# Patient Record
Sex: FEMALE | Race: BLACK OR AFRICAN AMERICAN | Employment: UNEMPLOYED | URBAN - METROPOLITAN AREA
[De-identification: names, ages, dates, MRNs, and addresses within clinical notes are randomized per-mention and may not be internally consistent; named-entity substitution may affect disease eponyms.]

---

## 2019-10-18 ENCOUNTER — HOSPITAL ENCOUNTER (OUTPATIENT)
Dept: CT IMAGING | Age: 39
Discharge: HOME | End: 2019-10-18
Payer: COMMERCIAL

## 2019-10-18 DIAGNOSIS — R10.9 ABDOMINAL PAIN, UNSPECIFIED ABDOMINAL LOCATION: ICD-10-CM

## 2019-10-18 PROCEDURE — 6360000004 HC RX CONTRAST MEDICATION

## 2019-10-18 PROCEDURE — 74177 CT ABD & PELVIS W/CONTRAST: CPT

## 2019-10-18 RX ADMIN — IOHEXOL 50 ML: 240 INJECTION, SOLUTION INTRATHECAL; INTRAVASCULAR; INTRAVENOUS; ORAL at 16:51

## 2019-10-18 RX ADMIN — IOPAMIDOL 75 ML: 755 INJECTION, SOLUTION INTRAVENOUS at 16:51

## 2021-01-26 ENCOUNTER — HOSPITAL ENCOUNTER (OUTPATIENT)
Dept: PHYSICAL THERAPY | Age: 41
Setting detail: THERAPIES SERIES
Discharge: HOME | End: 2021-01-26
Payer: COMMERCIAL

## 2021-05-17 NOTE — PLAN OF CARE
[facility]  Outpatient Rehabilitation and Therapy,  [facility]  [address] [facility], [facility]  Phone: [phone]   Fax:     [phone]          Physical Therapy Certification    Dear Referring Practitioner: Antonia Hunt MD,    We had the pleasure of evaluating the following patient for physical therapy services at [facility] and Therapy. A summary of our findings can be found in the initial assessment below. This includes our plan of care. If you have any questions or concerns regarding these findings, please do not hesitate to contact me at the office phone number checked above.   Thank you for the referral.       Physician Signature:_______________________________Date:__________________  By signing above (or electronic signature), therapists plan is approved by physician            Patient: Karl Ji   : 1980   MRN: [id number]     Referring Physician: Referring Practitioner: Antonia Hunt MD      Evaluation Date: 2021        Medical Diagnosis Information:  Diagnosis: Fibromyalgia, cervical disc disorder   Treatment Diagnosis: Decreased functional mobility 2/2 cervical pain/fibromyalgia                                         Insurance information: PT Insurance Information: [address]    Precautions/ Contra-indications:   Latex Allergy:  [x]NO      []YES  Preferred Language for Healthcare:   [x]English       []other:    C-SSRS Triggered by Intake questionnaire (Past 2 wk assessment ):   [x] No, Questionnaire did not trigger screening.   [] Yes, Patient intake triggered C-SSRS Screening      [] C-SSRS Screening completed  [] PCP notified via Epic     SUBJECTIVE: Patient stated complaint:    Relevant Medical History:   Functional Disability Index:     Pain Scale: /10  Easing factors:   Provocative factors:      Type: []Constant   []Intermittent  []Radiating []Localized []other: Craniocerv Flex testing Giulia Northern [] [] []    End Range Tolerance testing. [] [] []     [] [] []                           [x] Patient history, allergies, meds reviewed. Medical chart reviewed. See intake form. Review Of Systems (ROS):  [x]Performed Review of systems (Integumentary, CardioPulmonary, Neurological) by intake and observation. Intake form has been scanned into medical record. Patient has been instructed to contact their primary care physician regarding ROS issues if not already being addressed at this time. Co-morbidities/Complexities (which will affect course of rehabilitation):   []None           Arthritic conditions   []Rheumatoid arthritis (M05.9)  []Osteoarthritis (M19.91)   Cardiovascular conditions   []Hypertension (I10)  []Hyperlipidemia (E78.5)  []Angina pectoris (I20)  []Atherosclerosis (I70)  []CVA Musculoskeletal conditions   []Disc pathology   []Congenital spine pathologies   []Prior surgical intervention  []Osteoporosis (M81.8)  []Osteopenia (M85.8)   Endocrine conditions   []Hypothyroid (E03.9)  []Hyperthyroid Gastrointestinal conditions   []Constipation (R55.01)   Metabolic conditions   []Morbid obesity (E66.01)  []Diabetes type 1(E10.65) or 2 (E11.65)   []Neuropathy (G60.9)     Pulmonary conditions   []Asthma (J45)  []Coughing   []COPD (J44.9)   Psychological Disorders  []Anxiety (F41.9)  []Depression (F32.9)   []Other:   []Other:          Barriers to/and or personal factors that will affect rehab potential:              []Age  []Sex   []Smoker              []Motivation/Lack of Motivation                        []Co-Morbidities              []Cognitive Function, education/learning barriers              []Environmental, home barriers              []profession/work barriers  []past PT/medical experience  []other:  Justification:     Falls Risk Assessment (30 days):   [x] Falls Risk assessed and no intervention required.   [] Falls Risk assessed and Patient requires intervention due to being higher risk   TUG score (>12s at risk):     [] Falls education provided, including     ASSESSMENT: ***   Functional Impairments:     []Noted cervical/thoracic/GHJ joint hypomobility   []Noted cervical/thoracic/GHJ joint hypermobility   []Decreased cervical/UE functional ROM   []Noted Headache pain aggravated by neck movements with/without dizziness   []Abnormal reflexes/sensation/myotomal/dermatomal deficits   []Decreased DCF control or ability to hold head up   []Decreased RC/scapular/core strength and neuromuscular control    []Decreased UE functional strength   []other:      Functional Activity Limitations (from functional questionnaire and intake)   []Reduced ability to tolerate prolonged functional positions   []Reduced ability or difficulty with changes of positions or transfers between positions   []Reduced ability to maintain good posture and demonstrate good body mechanics with sitting, bending, and lifting   [] Reduced ability or tolerance with driving and/or computer work   []Reduced ability to perform lifting, reaching, carrying tasks   []Reduced ability to concentrate   []Reduced ability to sleep    []Reduced ability to tolerate any impact through UE or spine   []Reduced ability to ambulate prolonged functional periods/distances   []other:    Participation Restrictions   []Reduced participation in self care activities   []Reduced participation in home management activities   []Reduced participation in work activities   []Reduced participation in social activities. []Reduced participation in sport/recreational activities.     Classification/Subgrouping:   []signs/symptoms consistent with neck pain with mobility deficits     []signs/symptoms consistent with neck pain with movement coordinated impairments    []signs/symptoms consistent with neck pain with radiating pain    []signs/symptoms consistent with neck pain with headaches (cervicogenic)    []Signs/symptoms consistent with nerve root involvement including myotome & dermatome dysfunction   []sign/symptoms consistent with facet dysfunction of cervical and thoracic spine    []signs/symptoms consistent suggesting central cord compression/UMN syndromes   []signs/symptoms consistent with discogenic cervical pain   []signs/symptoms consistent with rib dysfunction   []signs/symptoms consistent with postural dysfunction   []signs/symptoms consistent with shoulder pathology    []signs/symptoms consistent with post-surgical status including decreased ROM, strength and function.    []signs/symptoms consistent with pathology which may benefit from Dry Needling   []signs/symptoms which may limit the use of advanced manual therapy techniques: (Elevated CV risk profile, recent trauma, intolerance to end range positions, prior TIA, visual issues, UE neurological compromise )     Prognosis/Rehab Potential:      []Excellent   []Good    []Fair   []Poor    Tolerance of evaluation/treatment:    []Excellent   []Good    []Fair   []Poor    Physical Therapy Evaluation Complexity Justification  [x] A history of present problem with:  [x] no personal factors and/or comorbidities that impact the plan of care;  []1-2 personal factors and/or comorbidities that impact the plan of care  []3 personal factors and/or comorbidities that impact the plan of care  [x] An examination of body systems using standardized tests and measures addressing any of the following: body structures and functions (impairments), activity limitations, and/or participation restrictions;:  [x] a total of 1-2 or more elements   [] a total of 3 or more elements   [] a total of 4 or more elements   [x] A clinical presentation with:  [x] stable and/or uncomplicated characteristics   [] evolving clinical presentation with changing characteristics  [] unstable and unpredictable characteristics;   [x] Clinical decision making of [] low, [] moderate, [] high complexity using standardized patient assessment

## 2021-05-18 ENCOUNTER — HOSPITAL ENCOUNTER (OUTPATIENT)
Dept: PHYSICAL THERAPY | Age: 41
Setting detail: THERAPIES SERIES
Discharge: HOME | End: 2021-05-18
Payer: COMMERCIAL

## 2021-05-18 PROCEDURE — 97530 THERAPEUTIC ACTIVITIES: CPT

## 2021-05-18 PROCEDURE — 97161 PT EVAL LOW COMPLEX 20 MIN: CPT

## 2021-05-18 ASSESSMENT — PAIN SCALES - QUEBEC BACK PAIN DISABILITY SCALE
WALK A FEW BLOCKS OR 300 TO 400M: 3
WALK SEVERAL KILOMETERS  OR MILES: 5
RIDE IN A CAR: 4
QUEBEC CMS MODIFIER: CK
MAKE YOUR BED: 0
PUT ON SOCKS OR PANYHOSE: 3
REACH UP TO HIGH SHELVES: 3
TURN OVER IN BED: 0
STAND UP FOR 20 TO 30 MINUTES: 4
SLEEP THROUGH THE NIGHT: 2
TAKE FOOD OUT OF THE REFRIGERATOR: 0
PULL OR PUSH HEAVY DOORS: 1

## 2021-05-18 NOTE — PLAN OF CARE
[facility] - Outpatient Rehabilitation and Therapy,  [facility]  [address] [facility], [facility]  Phone: [phone]   Fax:     [phone]          Physical Therapy Certification    Dear Referring Practitioner: Clayton Johnson MD,    We had the pleasure of evaluating the following patient for physical therapy services at [facility] and Therapy. A summary of our findings can be found in the initial assessment below. This includes our plan of care. If you have any questions or concerns regarding these findings, please do not hesitate to contact me at the office phone number checked above. Thank you for the referral.       Physician Signature:_______________________________Date:__________________  By signing above (or electronic signature), therapists plan is approved by physician            Patient: Isabel Shay   : 1980   MRN: [id number]  Referring Physician: Referring Practitioner: Clayton Johnson MD      Evaluation Date: 2021      Medical Diagnosis Information:  Diagnosis: Fibromyalgia, cervical disc disorder   Treatment Diagnosis: Decreased functional mobility 2/2 cervical pain/fibromyalgia                                         Insurance information: PT Insurance Information: [address]    Precautions/ Contra-indications:   Latex Allergy:  [x]NO      []YES  Preferred Language for Healthcare:   [x]English       []other:    C-SSRS Triggered by Intake questionnaire (Past 2 wk assessment ):   [] No, Questionnaire did not trigger screening.    [x] Yes, Patient intake triggered C-SSRS Screening      [x] C-SSRS Screening completed - pt is having trouble getting into a therapist b/c not many take [place] and is currently on a waiting list and have a virtual visit set up in July and calls daily to try to get in early  [x] PCP notified via Epic     C-SSRS Triggered by Intake questionnaire:     YES NO In the past month, have you wished you were dead or wished you could go to sleep and not wake up? x    In the past month, have you had any thoughts of killing yourself? X                    Lifetime   YES NO   Have you ever done anything, started to do anything, or prepared to do anything to end your life? X             Past 3 months    X         SUBJECTIVE: Patient stated complaint: Pt notes new dx of fibromyalgia but has been dealing with pain since 2014. Pain has worsened over the past few months with pain worse in back, neck, upper shoulders and thighs. Pain up to 8/10 on a typical day and gets N/T with activity. Pt has TENS that she uses when needed. Relevant Medical History: boot on left ankle from trip down the steps 2 wks ago ( to f/u with MD in 3 wks - but MD is aware that she will be getting into the pool for fibromyalgia and is ok with this and ok to wean out of boot when ready ); cervical fusion 2020; Crohns disease; HNP lumbar spine; anxiety; depression; migraines    Functional Disability Index: [place] 49    Pain Scale: 8/10  Easing factors: heat  Provocative factors: activity     Type: [x]Constant   []Intermittent  []Radiating []Localized []other:     Numbness/Tingling: yes \"with activity\"    Occupation/School: NA      Living Status/Prior Level of Function: Independent with ADLs and IADLs,     OBJECTIVE:   Palpation: TTP neck, UT, mid and low back     Functional Mobility/Transfers:     Posture: WFL    Bandages/Dressings/Incisions: NA    Gait: (include devices/WB status):  WNL    CERV ROM     Cervical Flexion 40    Cervical Extension 45     Left Right   Cervical SB 25 30   Lumbar flex   50  Lumbar ext  25  Lumbar SB L  15  Lumbar SB R  18     Cervical rotation Min dec Min dec    Quadrant     Dorsal Glide      UE ROM Left Right   Shoulder Flex All motions WFL but limited at end range with pain B     Shoulder Abd     Shoulder ER     Shoulder IR     Elbow flex/ext     Wrist flex/ext/pro/sup Finger flex/ext/opposition     Shoulder AROM WNL w OP     UE Strength  Left Right   Shoulder Flex 4+ 4+   Shoulder Scap 4+ 4+   Shoulder ABd (C5 Axillary)     Shoulder ER  5 5   Shoulder IR 5 5   Elbow Flex (C5 Musc) 5 5   Elbow Ext (C7 Radial) 5 5   Hip flex   QD  HS  DF 4  4+  4+  NT - boot 5  5  5  5   Wrist Flex (C6 Radial)     Wrist Ext (C7 Radial)     Finger flex (C8 median)     Finger ext (C7 Radial-PIN)     APB (T1 Median)     Finger Abd (T1 Ulnar)     UE myotomes WNL        Reflexes Normal Abnormal Comments               S1-2 Seated achilles [] []    S1-2 Prone knee bend [] []    L3-4 Patellar tendon [] []    C5-6 Biceps [] []    C6 Brachioradialis [] []    C7-8 Triceps [] []      LE flexibility - min tight SKC, pirif, and HS B with pain at end range all     Reflexes/Sensation:    [x]Dermatomes/Myotomes intact    [x]Reflexes equal and normal bilaterally   []Other:    Joint mobility: NT   []Normal    []Hypo   []Hyper      Orthopedic Special Tests: NT    Cluster Testing  Normal Abnormal N/A Comments   Babinski Test [] [] []    Huber Test [] [] []    Inverted Sup Sign [] [] []    Alar Ligament Test [] [] []    Transverse Ligament Test [] [] []    Sharp-Rodney Test [] [] []    Hautards Test [] [] []    Vertebral Artery Test [] [] []             Neural dynamic/ Tension testing Normal Abnormal N/A Comments   Spurling Maneuver:  [] [] []    Distraction testing: [] [] []    ULNT [] [] []    Shoulder Abd testing  [] [] []    Cerv Rot/Lat Flex- 1st Rib [] [] []    Deep Neck Flex/endurance testing [] [] []    Craniocerv Flex testing Romayne Part [] [] []    End Range Tolerance testing. [] [] []     [] [] []                           [x] Patient history, allergies, meds reviewed. Medical chart reviewed. See intake form. Review Of Systems (ROS):  [x]Performed Review of systems (Integumentary, CardioPulmonary, Neurological) by intake and observation. Intake form has been scanned into medical record.  Patient has been instructed to contact their primary care physician regarding ROS issues if not already being addressed at this time. Co-morbidities/Complexities (which will affect course of rehabilitation):   []None           Arthritic conditions   []Rheumatoid arthritis (M05.9)  []Osteoarthritis (M19.91)   Cardiovascular conditions   []Hypertension (I10)  []Hyperlipidemia (E78.5)  []Angina pectoris (I20)  []Atherosclerosis (I70)  []CVA Musculoskeletal conditions   []Disc pathology   []Congenital spine pathologies   [x]Prior surgical intervention  []Osteoporosis (M81.8)  []Osteopenia (M85.8)   Endocrine conditions   []Hypothyroid (E03.9)  []Hyperthyroid Gastrointestinal conditions   []Constipation (S68.99)   Metabolic conditions   []Morbid obesity (E66.01)  []Diabetes type 1(E10.65) or 2 (E11.65)   []Neuropathy (G60.9)     Pulmonary conditions   []Asthma (J45)  []Coughing   []COPD (J44.9)   Psychological Disorders  [x]Anxiety (F41.9)  [x]Depression (F32.9)   []Other:   [x]Other:     Crohs  migraines     Barriers to/and or personal factors that will affect rehab potential:              []Age  []Sex   []Smoker              []Motivation/Lack of Motivation                        [x]Co-Morbidities              []Cognitive Function, education/learning barriers              []Environmental, home barriers              []profession/work barriers  []past PT/medical experience  []other:  Justification:     Falls Risk Assessment (30 days):   [x] Falls Risk assessed and no intervention required.   [] Falls Risk assessed and Patient requires intervention due to being higher risk   TUG score (>12s at risk):     [] Falls education provided, including     ASSESSMENT:    Functional Impairments:     []Noted cervical/thoracic/GHJ joint hypomobility   []Noted cervical/thoracic/GHJ joint hypermobility   []Decreased cervical/UE functional ROM   []Noted Headache pain aggravated by neck movements with/without dizziness   []Abnormal pain   []signs/symptoms consistent with rib dysfunction   []signs/symptoms consistent with postural dysfunction   []signs/symptoms consistent with shoulder pathology    []signs/symptoms consistent with post-surgical status including decreased ROM, strength and function. []signs/symptoms consistent with pathology which may benefit from Dry Needling   []signs/symptoms which may limit the use of advanced manual therapy techniques: (Elevated CV risk profile, recent trauma, intolerance to end range positions, prior TIA, visual issues, UE neurological compromise )     Prognosis/Rehab Potential:      []Excellent   [x]Good    []Fair   []Poor    Tolerance of evaluation/treatment:    []Excellent   [x]Good    []Fair   []Poor    Physical Therapy Evaluation Complexity Justification  [x] A history of present problem with:  [] no personal factors and/or comorbidities that impact the plan of care;  []1-2 personal factors and/or comorbidities that impact the plan of care  [x]3 personal factors and/or comorbidities that impact the plan of care  [x] An examination of body systems using standardized tests and measures addressing any of the following: body structures and functions (impairments), activity limitations, and/or participation restrictions;:  [] a total of 1-2 or more elements   [] a total of 3 or more elements   [x] a total of 4 or more elements   [x] A clinical presentation with:  [x] stable and/or uncomplicated characteristics   [] evolving clinical presentation with changing characteristics  [] unstable and unpredictable characteristics;   [x] Clinical decision making of [x] low, [] moderate, [] high complexity using standardized patient assessment instrument and/or measurable assessment of functional outcome.     [x] EVAL (LOW) 07284 (typically 20 minutes face-to-face)  [] EVAL (MOD) 28673 (typically 30 minutes face-to-face)  [] EVAL (HIGH) 24361 (typically 45 minutes face-to-face)  [] RE-EVAL     PLAN: Frequency/Duration:  2 days per week for 2 Weeks:  Interventions:  [x]  Therapeutic exercise including: strength training, ROM, for cervical spine,scapula, core and Upper extremity, including postural re-education. [x]  NMR activation and proprioception for Deep cervical flexors, periscapular and RC muscles and Core, including postural re-education. [x]  Manual therapy as indicated for C/T spine, ribs, Soft tissue to include: Dry Needling/IASTM, STM, PROM, Gr I-IV mobilizations, manipulation. [x] Modalities as needed that may include: thermal agents, E-stim, Biofeedback, US, iontophoresis as indicated  [x] Patient education on joint protection, postural re-education, activity modification, progression of HEP. [x] Aquatic exercise including: strength training, ROM, for cervical spine,scapula, core and Upper extremity, including postural re-education. HEP instruction: see flowsheet     GOALS:  Patient stated goal: \"to be in less pain\"  [x] Progressing: [] Met: [] Not Met: [] Adjusted    Therapist goals for Patient:   Short Term Goals: To be achieved in: 2 weeks  1. Independent in HEP and progression per patient tolerance, in order to prevent re-injury. [x] Progressing: [] Met: [] Not Met: [] Adjusted  2. Patient will have a decrease in pain by 20-30% to facilitate improvement in movement, function, and ADLs as indicated by Functional Deficits. [x] Progressing: [] Met: [] Not Met: [] Adjusted   3. Patient will tolerate 40 min pool exercise  [] Progressing: [] Met: [] Not Met: [] Adjusted    Long Term Goals: To be achieved in: at discharge   1. Disability index score of 30 or less on the [place] to assist with reaching prior level of function. [x] Progressing: [] Met: [] Not Met: [] Adjusted   2. Patient will have a decrease in pain by 20-30% to facilitate improvement in movement, function, and ADLs as indicated by Functional Deficits. [x] Progressing: [] Met: [] Not Met: [] Adjusted   3.  Patient

## 2021-05-18 NOTE — FLOWSHEET NOTE
[address] [place]  [address] [facility]  Phone: [phone]   Fax:     [phone]      Physical Therapy Daily/Aquatic Flow Sheet   Date:  2021    Patient Name:  Hima Dela Cruz    :  1980  MRN: [id number]    Restrictions/Precautions:    Pertinent Medical History:  Relevant Medical History: boot on left ankle from trip down the steps 2 wks ago ( to f/u with MD in 3 wks - but MD is aware that she will be getting into the pool for fibromyalgia and is ok with this and ok to wean out of boot when ready ); cervical fusion ; Crohns disease; HNP lumbar spine; anxiety; depression; migraines    Medical/Treatment Diagnosis Information:   · Diagnosis: Fibromyalgia, cervical disc disorder  · Treatment Diagnosis: Decreased functional mobility 2/2 cervical pain/fibromyalgia    Insurance/Certification information:  PT Insurance Information: [facility]  Physician Information:  Referring Practitioner: Dannie Kessler MD  Plan of care signed (Y/N):     Date of Patient follow up with Physician:      Progress Report: []  Yes  [x]  No     Date Range for reporting period:  Beginnin2021  Ending:      Progress report due (10 Rx/or 30 days whichever is less):     Recertification due (POC duration/ or 90 days whichever is less):      Visit # POC/Insurance Allowable Auth Needed     []Yes   [x]No     Latex Allergy:  [x]NO      []YES  Preferred Language for Healthcare:   [x]English       []Other:    Functional Scale:     Date assessed: at eval  Test: Julio Cesar   Score:49    Pain level: 8/10    History of Injury:SUBJECTIVE: Patient stated complaint: Pt notes new dx of fibromyalgia but has been dealing with pain since . Pain has worsened over the past few months with pain worse in back, neck, upper shoulders and thighs. Pain up to 8/10 on a typical day and gets N/T with activity. Pt has TENS that she uses when needed. flex & ext  Step down    Hip Abd & Adduction  [place] squats    Bicycle   Crate Lifts    Add Set with ball  Lunges forward    LX stab with med ball throws  Lunges lateral    Ankle INV  Lunges retro    Ankle EV  Lower ab curl with noodle      Upper ab curl with ball      Med ball straight lifts      Med ball diagonal lifts      Hydrorider          Noodle:      Leg Press  Deep Water:    Noodle hang at wall  Jog    Noodle hang deep water  Jumping Jacks    Noodle Bicycle  Heel to toe      Hand to opposite knee      Cross country skier      Rocking Horse      Other Therapeutic Activities:  Pt was educated on PT POC, Diagnosis, Prognosis, pathomechanics as well as frequency and duration of scheduling future physical therapy appointments. Time was also taken on this day to answer all patient questions and participation in PT. Reviewed appointment policy in detail with patient and patient verbalized understanding. Pool tour and info issued x 10 min      Home Exercise Program:  Patient was instructed in the following for HEP:     . Patient verbalized/demonstrated understanding and was issued written handout. Charges: Therapeutic Exercise and NMR EXR  [] (44480) Provided verbal/tactile cueing for activities related to strengthening, flexibility, endurance, ROM for improvements in LE, proximal hip, and core control with self care, mobility, lifting, ambulation.  [] (50947) Provided verbal/tactile cueing for activities related to improving balance, coordination, kinesthetic sense, posture, motor skill, proprioception  to assist with LE, proximal hip, and core control in self care, mobility, lifting, ambulation and eccentric single leg control.      NMR and Therapeutic Activities:    [] (25159 or 80829) Provided verbal/tactile cueing for activities related to improving balance, coordination, kinesthetic sense, posture, motor skill, proprioception and motor activation to allow for proper function of core, proximal hip and LE with self care and ADLs and functional mobility.   [] (26925) Gait Re-education- Provided training and instruction to the patient for proper LE, core and proximal hip recruitment and positioning and eccentric body weight control with ambulation re-education including up and down stairs     Home Exercise Program:    [x] (62125) Reviewed/Progressed HEP activities related to strengthening, flexibility, endurance, ROM of core, proximal hip and LE for functional self-care, mobility, lifting and ambulation/stair navigation   [] (29742)Reviewed/Progressed HEP activities related to improving balance, coordination, kinesthetic sense, posture, motor skill, proprioception of core, proximal hip and LE for self care, mobility, lifting, and ambulation/stair navigation      Manual Treatments:  PROM / STM / Oscillations-Mobs:  G-I, II, III, IV (PA's, Inf., Post.)  [] (26136) Provided manual therapy to mobilize LE, proximal hip and/or LS spine soft tissue/joints for the purpose of modulating pain, promoting relaxation,  increasing ROM, reducing/eliminating soft tissue swelling/inflammation/restriction, improving soft tissue extensibility and allowing for proper ROM for normal function with self care, mobility, lifting and ambulation.      Individual Aquatic Therapy:  [] (06625) Provided verbal and tactile cueing for strengthening, flexibility, ROM using the therapeutic properties of water (buoyancy, resistance) for improvements in core control, mobility and ambulation     Aquatic Group:  [] (23791) Provided intermittent verbal and tactile cueing for strengthening, endurance, flexibility and ROM for 2-4 individuals that do not require one-on one patient contact by the therapist       Land Timed Code Treatment Minutes: 10   Land Total Treatment Minutes: 30     Individual Aquatic Minutes:    Aquatic Group Minutes:      [x] EVAL (LOW) 57256 (typically 20 minutes face-to-face)  [] EVAL (MOD) 85782 (typically 30 minutes face-to-face)  [] EVAL (HIGH) 24140 (typically 45 minutes face-to-face)  [] RE-EVAL     [] AS(18352) x     [] Dry needle 1 or 2 Muscles (31913)  [] NMR (31119) x     [] Dry needle 3+ Muscles (21403)  [] Manual (75872) x     [] Ultrasound (15977) x  [x] TA (43334) x     [] Mech Traction (72987)  [] ES(attended) (17001)     [] ES (un) (65328):   [] Vasopump (67937) [] Ionto (33424)   [] Aquatic Ind (46928) x    [] Aquatic Group ([phone]) x   [] Other:        Treatment/Activity Tolerance:  [x] Patient tolerated treatment well [] Patient limited by fatigue  [] Patient limited by pain  [] Patient limited by other medical complications  [] Other:     Prognosis: [x] Good [] Fair  [] Poor       GOALS:  Patient stated goal: \"to be in less pain\"  [x]? Progressing: []? Met: []? Not Met: []? Adjusted     Therapist goals for Patient:   Short Term Goals: To be achieved in: 2 weeks  1. Independent in HEP and progression per patient tolerance, in order to prevent re-injury. [x]? Progressing: []? Met: []? Not Met: []? Adjusted  2. Patient will have a decrease in pain by 20-30% to facilitate improvement in movement, function, and ADLs as indicated by Functional Deficits. [x]? Progressing: []? Met: []? Not Met: []? Adjusted   3. Patient will tolerate 40 min pool exercise  []? Progressing: []? Met: []? Not Met: []? Adjusted     Long Term Goals: To be achieved in: at discharge   1. Disability index score of 30 or less on the [place] to assist with reaching prior level of function. [x]? Progressing: []? Met: []? Not Met: []? Adjusted   2. Patient will have a decrease in pain by 20-30% to facilitate improvement in movement, function, and ADLs as indicated by Functional Deficits. [x]? Progressing: []? Met: []? Not Met: []? Adjusted   3. Patient will demonstrate increased AROM to [facility] with min-no pain to allow for proper joint functioning as indicated by patients Functional Deficits. [x]? Progressing: []? Met: []?  Not Met: []?

## 2021-05-25 ENCOUNTER — HOSPITAL ENCOUNTER (OUTPATIENT)
Dept: PHYSICAL THERAPY | Age: 41
Setting detail: THERAPIES SERIES
Discharge: HOME | End: 2021-05-25
Payer: COMMERCIAL

## 2021-05-25 PROCEDURE — 97113 AQUATIC THERAPY/EXERCISES: CPT

## 2021-05-25 NOTE — FLOWSHEET NOTE
[address], [facility]  [address] [facility]  Phone: [phone]   Fax:     [phone]      Physical Therapy Daily/Aquatic Flow Sheet   Date:  2021    Patient Name:  Larna Shone    :  1980  MRN: [id number]    Restrictions/Precautions:    Pertinent Medical History:  Relevant Medical History: boot on left ankle from trip down the steps 2 wks ago ( to f/u with MD in 3 wks - but MD is aware that she will be getting into the pool for fibromyalgia and is ok with this and ok to wean out of boot when ready ); cervical fusion ; Crohns disease; HNP lumbar spine; anxiety; depression; migraines    Medical/Treatment Diagnosis Information:   · Diagnosis: Fibromyalgia, cervical disc disorder  · Treatment Diagnosis: Decreased functional mobility 2/2 cervical pain/fibromyalgia    Insurance/Certification information:  PT Insurance Information: [address]  Physician Information:  Referring Practitioner: Lottie Merino MD  Plan of care signed (Y/N):     Date of Patient follow up with Physician:      Progress Report: []  Yes  [x]  No     Date Range for reporting period:  Beginnin2021  Ending:      Progress report due (10 Rx/or 30 days whichever is less):     Recertification due (POC duration/ or 90 days whichever is less):      Visit # POC/Insurance Allowable Auth Needed     []Yes   [x]No     Latex Allergy:  [x]NO      []YES  Preferred Language for Healthcare:   [x]English       []Other:    Functional Scale:     Date assessed: at eval  Test: Julio Cesar   Score:49    Pain level:    LBP   8/10                          After Rx  9/10                  Neck  6/10                                         8/10                L ankle 8/10                                       5/10    History of Injury:SUBJECTIVE: Patient stated complaint: Pt notes new dx of fibromyalgia but has been dealing with pain since .   Pain has worsened over the past few months with pain worse in back, neck, upper shoulders and thighs. Pain up to 8/10 on a typical day and gets N/T with activity. Pt has TENS that she uses when needed.      Subjective:    5-25-21  Chief c/o L ankle, Neck/ LBP. To pool with boot L foot. Increased neck pain after sneeze yesterday where she felt a pop.  Doing L ankle theraband ex at home per MD.          Objective:    Observation:    Test measurements:    Land-based Visits Exercises/Activities: No Land Rx today  Therapeutic Ex (02099)  Min: Resistance/Repetitions Notes                  Therapeutic Activity (63762) Min:10 See below                   NMR re-education (94453) Min:                          Manual Intervention (78434)  Min:                    Modalities  Min:               Aquatic Visits Exercises/Activities:  TO and FROM POOL WITH BOOT L FOOT  Aquatic Abbreviation Key  B= Belt DB= Dumbells T= Theratube   G=Gloves N= Noodles W= Weights   P= Paddles WW=Water Walker K= Kickboard        Transfers:   LIFT due to L ankle      % Immersion: 75%-90%            Ambulation:   Exercises UE:  B   25% WB    Forwards  Shoulder Shrugs 10    Lateral  Shoulder circles 10    Marching    Scapular Retraction 10    Retro   Rolling 10    [place]  Push Downs 10   Multifidus walkouts w/paddle  IR/ER 10     Punching    Stretching:  Rowing 10   Gastroc/Soleus   Elbow Flex/Ext 10   Hamstring   Shldr Flex/Ext    Knee flex stretch   Shldr Abd/Add    Piriformis   Horiz Abd/Add    Hip flexor    Arm Circles    SKTC   PNF Diagonals    DKTC  UE oscillations f/b, s/s    ITB   Wall Push-ups    Quad  Figure 8's    Mid back   Buoy pull/push downs    UT  Tband rows    Rhom  Tband lats    Post Shoulder  Lumbar Rot w/ paddles    Pec   Small ball pull downs                   diagonals             Cervical:       AROM Flex       AROM Extension     LEs exercises:  10 % WB  AROM Side Bending    Marching  10 AROM Rotation    Heel Raises X Chin tucks    Toe Raises X Chin nods     Squats  X      Hamstring Curls  10      Hip Flexion  5   agg Balance: Hip Abduction 10 SLS    Hip Circles 10 Tandem stance    TA set 10 NBOS eyes open    Glut Set 10 NBOS eyes closed    Hip Extension  Hand to Opposite Knee    Hip Adduction    Box Step     Hip IR   Noodle Stance     Hip ER  Stop/Go Gait    Fig 8's  Switch Gait                Seated: B Functional:    Ankle Pumps  Step up forward    Ankle circles  Step up lateral    Knee flex & ext 10 Step down    Hip Abd & Adduction 10 [place] squats    Bicycle  10 Crate Lifts    Add Set with ball  Lunges forward    LX stab with med ball throws  Lunges lateral    Ankle INV  Lunges retro    Ankle EV  Lower ab curl with noodle      Upper ab curl with ball      Med ball straight lifts      Med ball diagonal lifts      Hydrorider          Noodle:      Leg Press  Deep Water:    Noodle hang at wall  Jog    Noodle hang deep water  Jumping Jacks    Noodle Bicycle  Heel to toe      Hand to opposite knee      Cross country skier      Rocking Horse      Other Therapeutic Activities:  Pt was educated on PT POC, Diagnosis, Prognosis, pathomechanics as well as frequency and duration of scheduling future physical therapy appointments. Time was also taken on this day to answer all patient questions and participation in PT. Reviewed appointment policy in detail with patient and patient verbalized understanding. Pool tour and info issued x 10 min      Home Exercise Program:  Patient was instructed in the following for HEP:     . Patient verbalized/demonstrated understanding and was issued written handout. Charges:   Therapeutic Exercise and NMR EXR  [] (38224) Provided verbal/tactile cueing for activities related to strengthening, flexibility, endurance, ROM for improvements in LE, proximal hip, and core control with self care, mobility, lifting, ambulation.  [] (66814) Provided verbal/tactile cueing for activities related to improving balance, ambulation     Aquatic Group:  [] (99159) Provided intermittent verbal and tactile cueing for strengthening, endurance, flexibility and ROM for 2-4 individuals that do not require one-on one patient contact by the therapist       Land Timed Code Treatment Minutes:    Land Total Treatment Minutes: Individual Aquatic Minutes: 40   Aquatic Group Minutes: 0     [] EVAL (LOW) 81414 (typically 20 minutes face-to-face)  [] EVAL (MOD) 32481 (typically 30 minutes face-to-face)  [] EVAL (HIGH) 36320 (typically 45 minutes face-to-face)  [] RE-EVAL     [] ZK(82447) x     [] Dry needle 1 or 2 Muscles (77650)  [] NMR (66958) x     [] Dry needle 3+ Muscles (70318)  [] Manual (95699) x     [] Ultrasound (11337) x  [] TA (14558) x     [] Mech Traction (60986)  [] ES(attended) (32599)     [] ES (un) (42964):   [] Vasopump (46413) [] Ionto (85762)   [x] Aquatic Ind (25732) x 3    [] Aquatic Group (32353) x   [] Other:        Treatment/Activity Tolerance:  [x] Patient tolerated treatment well [] Patient limited by fatigue  [] Patient limited by pain  [] Patient limited by other medical complications  [] Other:     Prognosis: [x] Good [] Fair  [] Poor       GOALS:  Patient stated goal: \"to be in less pain\"  [x]? Progressing: []? Met: []? Not Met: []? Adjusted     Therapist goals for Patient:   Short Term Goals: To be achieved in: 2 weeks  1. Independent in HEP and progression per patient tolerance, in order to prevent re-injury. [x]? Progressing: []? Met: []? Not Met: []? Adjusted  2. Patient will have a decrease in pain by 20-30% to facilitate improvement in movement, function, and ADLs as indicated by Functional Deficits. [x]? Progressing: []? Met: []? Not Met: []? Adjusted   3. Patient will tolerate 40 min pool exercise  []? Progressing: []? Met: []? Not Met: []? Adjusted     Long Term Goals: To be achieved in: at discharge   1.  Disability index score of 30 or less on the [place] to assist with reaching prior level of

## 2021-05-27 ENCOUNTER — HOSPITAL ENCOUNTER (OUTPATIENT)
Dept: PHYSICAL THERAPY | Age: 41
Setting detail: THERAPIES SERIES
Discharge: HOME | End: 2021-05-27
Payer: COMMERCIAL

## 2021-05-27 PROCEDURE — 97113 AQUATIC THERAPY/EXERCISES: CPT

## 2021-05-27 PROCEDURE — 97150 GROUP THERAPEUTIC PROCEDURES: CPT

## 2021-05-27 NOTE — FLOWSHEET NOTE
[address], [place]  [address] [facility]  Phone: [phone]   Fax:     [phone]      Physical Therapy Daily/Aquatic Flow Sheet   Date:  2021    Patient Name:  Davin Vaca    :  1980  MRN: [id number]    Restrictions/Precautions:    Pertinent Medical History:  Relevant Medical History: boot on left ankle from trip down the steps 2 wks ago ( to f/u with MD in 3 wks - but MD is aware that she will be getting into the pool for fibromyalgia and is ok with this and ok to wean out of boot when ready ); cervical fusion ; Crohns disease; HNP lumbar spine; anxiety; depression; migraines    Medical/Treatment Diagnosis Information:   · Diagnosis: Fibromyalgia, cervical disc disorder  · Treatment Diagnosis: Decreased functional mobility 2/2 cervical pain/fibromyalgia    Insurance/Certification information:  PT Insurance Information: [address]  Physician Information:  Referring Practitioner: Shree Vigil MD  Plan of care signed (Y/N):     Date of Patient follow up with Physician:      Progress Report: []  Yes  [x]  No     Date Range for reporting period:  Beginnin2021  Ending:      Progress report due (10 Rx/or 30 days whichever is less): 35    Recertification due (POC duration/ or 90 days whichever is less):      Visit # POC/Insurance Allowable Auth Needed   3/8  []Yes   [x]No     Latex Allergy:  [x]NO      []YES  Preferred Language for Healthcare:   [x]English       []Other:    Functional Scale:     Date assessed: at eval  Test: Julio Cesar   Score:49    Pain level:    LBP   9/10                          After Rx  9/10                  Neck  9/10                                         9/10                L ankle 6/10                                       7/10    History of Injury:SUBJECTIVE: Patient stated complaint: Pt notes new dx of fibromyalgia but has been dealing with pain since .   Pain has LEs exercises:  10 % WB  AROM Side Bending    Marching  10 AROM Rotation    Heel Raises X Chin tucks    Toe Raises X Chin nods     Squats  5      Hamstring Curls  5      Hip Flexion  5    Balance: Hip Abduction 5 SLS    Hip Circles 5 Tandem stance    TA set 5 NBOS eyes open    Glut Set 5 NBOS eyes closed    Hip Extension  Hand to Opposite Knee    Hip Adduction    Box Step     Hip IR   Noodle Stance     Hip ER  Stop/Go Gait    Fig 8's  Switch Gait                Seated: B Functional:    Ankle Pumps 10 Step up forward    Ankle circles  Step up lateral    Knee flex & ext 10 Step down    Hip Abd & Adduction 10 [place] squats    Bicycle  10 Crate Lifts    Add Set with ball 10  Lunges forward    LX stab with med ball throws  Lunges lateral    Ankle INV  Lunges retro    Ankle EV  Lower ab curl with noodle      Upper ab curl with ball      Med ball straight lifts      Med ball diagonal lifts      Hydrorider          Noodle:      Leg Press  Deep Water:    Noodle hang at wall  Jog    Noodle hang deep water  Jumping Jacks    Noodle Bicycle  Heel to toe      Hand to opposite knee    Jet massage   LB 2 min    [place] good Cross country skier      [employer]      Other Therapeutic Activities:  Pt was educated on PT POC, Diagnosis, Prognosis, pathomechanics as well as frequency and duration of scheduling future physical therapy appointments. Time was also taken on this day to answer all patient questions and participation in PT. Reviewed appointment policy in detail with patient and patient verbalized understanding. Pool tour and info issued x 10 min      Home Exercise Program:  Patient was instructed in the following for HEP:     . Patient verbalized/demonstrated understanding and was issued written handout. Charges:   Therapeutic Exercise and NMR EXR  [] (79045) Provided verbal/tactile cueing for activities related to strengthening, flexibility, endurance, ROM for improvements in LE, proximal hip, and core control with self care, mobility, lifting, ambulation.  [] (18086) Provided verbal/tactile cueing for activities related to improving balance, coordination, kinesthetic sense, posture, motor skill, proprioception  to assist with LE, proximal hip, and core control in self care, mobility, lifting, ambulation and eccentric single leg control. NMR and Therapeutic Activities:    [] (66774 or 48996) Provided verbal/tactile cueing for activities related to improving balance, coordination, kinesthetic sense, posture, motor skill, proprioception and motor activation to allow for proper function of core, proximal hip and LE with self care and ADLs and functional mobility.   [] (93028) Gait Re-education- Provided training and instruction to the patient for proper LE, core and proximal hip recruitment and positioning and eccentric body weight control with ambulation re-education including up and down stairs     Home Exercise Program:    [x] (00292) Reviewed/Progressed HEP activities related to strengthening, flexibility, endurance, ROM of core, proximal hip and LE for functional self-care, mobility, lifting and ambulation/stair navigation   [] (14865)Reviewed/Progressed HEP activities related to improving balance, coordination, kinesthetic sense, posture, motor skill, proprioception of core, proximal hip and LE for self care, mobility, lifting, and ambulation/stair navigation      Manual Treatments:  PROM / STM / Oscillations-Mobs:  G-I, II, III, IV (PA's, Inf., Post.)  [] (85148) Provided manual therapy to mobilize LE, proximal hip and/or LS spine soft tissue/joints for the purpose of modulating pain, promoting relaxation,  increasing ROM, reducing/eliminating soft tissue swelling/inflammation/restriction, improving soft tissue extensibility and allowing for proper ROM for normal function with self care, mobility, lifting and ambulation.      Individual Aquatic Therapy:  [x] (91922) Provided verbal and tactile cueing for strengthening, flexibility, ROM using the therapeutic properties of water (buoyancy, resistance) for improvements in core control, mobility and ambulation     Aquatic Group:  [x] (71266) Provided intermittent verbal and tactile cueing for strengthening, endurance, flexibility and ROM for 2-4 individuals that do not require one-on one patient contact by the therapist       Land Timed Code Treatment Minutes:    Land Total Treatment Minutes: Individual Aquatic Minutes: 15   Aquatic Group Minutes: 25     [] EVAL (LOW) 24582 (typically 20 minutes face-to-face)  [] EVAL (MOD) 37372 (typically 30 minutes face-to-face)  [] EVAL (HIGH) 94066 (typically 45 minutes face-to-face)  [] RE-EVAL     [] LD(10139) x     [] Dry needle 1 or 2 Muscles (85464)  [] NMR (27771) x     [] Dry needle 3+ Muscles (27121)  [] Manual (68215) x     [] Ultrasound (81653) x  [] TA (60792) x     [] Mech Traction (90479)  [] ES(attended) (12097)     [] ES (un) (07999):   [] Vasopump (75746) [] Ionto (11508)   [x] Aquatic Ind (89952) x     [x] Aquatic Group (05340) x   [] Other:        Treatment/Activity Tolerance:  [x] Patient tolerated treatment well [] Patient limited by fatigue  [] Patient limited by pain  [] Patient limited by other medical complications  [x] Other:  Cues for posture and relaxation. Prognosis: [x] Good [] Fair  [] Poor       GOALS:  Patient stated goal: \"to be in less pain\"  [x]? Progressing: []? Met: []? Not Met: []? Adjusted     Therapist goals for Patient:   Short Term Goals: To be achieved in: 2 weeks  1. Independent in HEP and progression per patient tolerance, in order to prevent re-injury. [x]? Progressing: []? Met: []? Not Met: []? Adjusted  2. Patient will have a decrease in pain by 20-30% to facilitate improvement in movement, function, and ADLs as indicated by Functional Deficits. [x]? Progressing: []? Met: []? Not Met: []? Adjusted   3. Patient will tolerate 40 min pool exercise  []? Progressing: []?  Met: []? Not Met: []? Adjusted     Long Term Goals: To be achieved in: at discharge   1. Disability index score of 30 or less on the [place] to assist with reaching prior level of function. [x]? Progressing: []? Met: []? Not Met: []? Adjusted   2. Patient will have a decrease in pain by 20-30% to facilitate improvement in movement, function, and ADLs as indicated by Functional Deficits. [x]? Progressing: []? Met: []? Not Met: []? Adjusted   3. Patient will demonstrate increased AROM to [facility] with min-no pain to allow for proper joint functioning as indicated by patients Functional Deficits. [x]? Progressing: []? Met: []? Not Met: []? Adjusted  4. Patient will demonstrate an increase in strength to 5/5 B UE/LE to allow for proper functional mobility as indicated by patients Functional Deficits. [x]? Progressing: []? Met: []? Not Met: []? Adjusted  5. Patient will return to light household duties without increased symptoms or restriction. [x]? Progressing: []? Met: []? Not Met: []? Adjusted  6. \"sleep soundly\"  [x]? Progressing: []? Met: []? Not Met: []? Adjusted            Patient Requires Follow-up: [x] Yes  [] No    Plan:   [x] Continue per plan of care [] Alter current plan (see comments)  [] Plan of care initiated [] Hold pending MD visit [] Discharge    Plan for Next Session:  Gradually progress aquatic exercise as tolerated to increase   ROM, strength, and endurance to improve ADL's and decrease pain. ADD: increase gt, seated ex, NBOS balance,  as tolerated. Electronically signed by:  Lisbeth Núñez,     Note: If patient does not return for scheduled/recommended follow up visits, this note will serve as a discharge from care along with the most recent update on progress.

## 2021-06-01 ENCOUNTER — HOSPITAL ENCOUNTER (OUTPATIENT)
Dept: PHYSICAL THERAPY | Age: 41
Setting detail: THERAPIES SERIES
Discharge: HOME | End: 2021-06-01
Payer: COMMERCIAL

## 2021-06-01 PROCEDURE — 97150 GROUP THERAPEUTIC PROCEDURES: CPT

## 2021-06-01 PROCEDURE — 97113 AQUATIC THERAPY/EXERCISES: CPT

## 2021-06-01 NOTE — FLOWSHEET NOTE
[address], [facility]  [address] [place], [facility]  Phone: [phone]   Fax:     [phone]      Physical Therapy Daily/Aquatic Flow Sheet   Date:  2021    Patient Name:  Isabel Shay    :  1980  MRN: [id number]    Restrictions/Precautions:    Pertinent Medical History:  Relevant Medical History: boot on left ankle from trip down the steps 2 wks ago ( to f/u with MD in 3 wks - but MD is aware that she will be getting into the pool for fibromyalgia and is ok with this and ok to wean out of boot when ready ); cervical fusion ; Crohns disease; HNP lumbar spine; anxiety; depression; migraines    Medical/Treatment Diagnosis Information:   · Diagnosis: Fibromyalgia, cervical disc disorder  · Treatment Diagnosis: Decreased functional mobility 2/2 cervical pain/fibromyalgia    Insurance/Certification information:  PT Insurance Information: [address]  Physician Information:  Referring Practitioner: Clayton Johnson MD  Plan of care signed (Y/N):     Date of Patient follow up with Physician:      Progress Report: []  Yes  [x]  No     Date Range for reporting period:  Beginnin2021  Ending:      Progress report due (10 Rx/or 30 days whichever is less): 3/44/57    Recertification due (POC duration/ or 90 days whichever is less):      Visit # POC/Insurance Allowable Auth Needed     []Yes   [x]No     Latex Allergy:  [x]NO      []YES  Preferred Language for Healthcare:   [x]English       []Other:    Functional Scale:     Date assessed: at eval  Test: Julio Cesar   Score:49    Pain level:    LBP   8/10                          After Rx  -/10                  Neck  6/10                                         -/10                L ankle /10                                       -/10    History of Injury:SUBJECTIVE: Patient stated complaint: Pt notes new dx of fibromyalgia but has been dealing with pain since .   Pain has worsened over the past few months with pain worse in back, neck, upper shoulders and thighs. Pain up to 8/10 on a typical day and gets N/T with activity. Pt has TENS that she uses when needed.      Subjective:    5-25-21  Chief c/o L ankle, Neck/ LBP. To pool with boot L foot. Increased neck pain after sneeze yesterday where she felt a pop. Doing L ankle theraband ex at home per MD.  5-27-21  Increased pain after first aquatics. Walking from bed to bathroom at home without boot. 6-1-21 sore after aquatic therapy. Walking in house w/o boot. Not doing much activity though.   10 min late          Objective:    Observation:    Test measurements:    Land-based Visits Exercises/Activities: No Land Rx today  Therapeutic Ex (37887)  Min: Resistance/Repetitions Notes                  Therapeutic Activity (25523) Min:10 See below                   NMR re-education (75536) Min:                          Manual Intervention (98152)  Min:                    Modalities  Min:               Aquatic Visits Exercises/Activities:  TO and FROM POOL WITH BOOT L FOOT  Aquatic Abbreviation Key  B= Belt DB= Dumbells T= Theratube   G=Gloves N= Noodles W= Weights   P= Paddles WW=Water Walker K= Kickboard        Transfers:   LIFT due to L ankle      % Immersion: 75%-90%            Ambulation:  laps   25% WB Exercises UE:  B   25% WB    Forwards 2  Shoulder Shrugs 5    Lateral  Shoulder circles 5    Marching    Scapular Retraction 5    Retro   Rolling 5    [place]  Push Downs 5   Multifidus walkouts w/paddle  IR/ER 5     Punching    Stretching:  Rowing 5   Gastroc/Soleus   Elbow Flex/Ext 5   Hamstring   Shldr Flex/Ext 5   Knee flex stretch   Shldr Abd/Add 5   Piriformis   Horiz Abd/Add 5   Hip flexor    Arm Circles 5   SKTC   PNF Diagonals 5     B UE pull down to hips Deferred  aggravated   DKTC  UE oscillations f/b, s/s    ITB   Wall Push-ups    Quad  Figure 8's    Mid back   Buoy pull/push downs    UT  Tband rows    Rhom  Tband lats Post Shoulder  Lumbar Rot w/ paddles    Pec   Small ball pull downs                   diagonals             Cervical:       AROM Flex       AROM Extension     LEs exercises:  10 % WB  AROM Side Bending    Marching  10 AROM Rotation  gentle R/L x 10  @ 95% immersion   Heel Raises X Chin tucks    Toe Raises X Chin nods     Squats 7      Hamstring Curls 7      Hip Flexion 7 Balance: Hip Abduction 7 SLS    Hip Circles 7 Tandem stance    TA set 7 NBOS eyes open    Glut Set 7 NBOS eyes closed    Hip Extension  Hand to Opposite Knee    Hip Adduction    Box Step     Hip IR   Noodle Stance     Hip ER  Stop/Go Gait    Fig 8's  Switch Gait                Seated: B Functional:    Ankle Pumps 10 Step up forward    Ankle circles  Step up lateral    Knee flex & ext 10 Step down    Hip Abd & Adduction 10 [place] squats    Bicycle  10 Crate Lifts    Add Set with ball 10  Lunges forward    LX stab with med ball throws  Lunges lateral    Ankle INV  Lunges retro    Ankle EV  Lower ab curl with noodle      Upper ab curl with ball      Med ball straight lifts      Med ball diagonal lifts      Hydrorider          Noodle:      Leg Press  Deep Water:    Noodle hang at wall  Jog    Noodle hang deep water  Jumping Jacks    Noodle Bicycle  Heel to toe      Hand to opposite knee    Jet massage   LB  Cross country skier      [employer]      Other Therapeutic Activities:  Pt was educated on PT POC, Diagnosis, Prognosis, pathomechanics as well as frequency and duration of scheduling future physical therapy appointments. Time was also taken on this day to answer all patient questions and participation in PT. Reviewed appointment policy in detail with patient and patient verbalized understanding. Pool tour and info issued x 10 min      Home Exercise Program:  Patient was instructed in the following for HEP:     . Patient verbalized/demonstrated understanding and was issued written handout. Charges:   Therapeutic Exercise and NMR EXR  [] (31894) Provided verbal/tactile cueing for activities related to strengthening, flexibility, endurance, ROM for improvements in LE, proximal hip, and core control with self care, mobility, lifting, ambulation.  [] (30566) Provided verbal/tactile cueing for activities related to improving balance, coordination, kinesthetic sense, posture, motor skill, proprioception  to assist with LE, proximal hip, and core control in self care, mobility, lifting, ambulation and eccentric single leg control.      NMR and Therapeutic Activities:    [] (62579 or 97458) Provided verbal/tactile cueing for activities related to improving balance, coordination, kinesthetic sense, posture, motor skill, proprioception and motor activation to allow for proper function of core, proximal hip and LE with self care and ADLs and functional mobility.   [] (01563) Gait Re-education- Provided training and instruction to the patient for proper LE, core and proximal hip recruitment and positioning and eccentric body weight control with ambulation re-education including up and down stairs     Home Exercise Program:    [x] (10683) Reviewed/Progressed HEP activities related to strengthening, flexibility, endurance, ROM of core, proximal hip and LE for functional self-care, mobility, lifting and ambulation/stair navigation   [] (28798)Reviewed/Progressed HEP activities related to improving balance, coordination, kinesthetic sense, posture, motor skill, proprioception of core, proximal hip and LE for self care, mobility, lifting, and ambulation/stair navigation      Manual Treatments:  PROM / STM / Oscillations-Mobs:  G-I, II, III, IV (PA's, Inf., Post.)  [] (84485) Provided manual therapy to mobilize LE, proximal hip and/or LS spine soft tissue/joints for the purpose of modulating pain, promoting relaxation,  increasing ROM, reducing/eliminating soft tissue swelling/inflammation/restriction, improving soft tissue extensibility and allowing for proper ROM for normal function with self care, mobility, lifting and ambulation. Individual Aquatic Therapy:  [x] (78647) Provided verbal and tactile cueing for strengthening, flexibility, ROM using the therapeutic properties of water (buoyancy, resistance) for improvements in core control, mobility and ambulation     Aquatic Group:  [x] (93042) Provided intermittent verbal and tactile cueing for strengthening, endurance, flexibility and ROM for 2-4 individuals that do not require one-on one patient contact by the therapist       Land Timed Code Treatment Minutes:    Land Total Treatment Minutes: Individual Aquatic Minutes: 35   Aquatic Group Minutes: 5     [] EVAL (LOW) 43594 (typically 20 minutes face-to-face)  [] EVAL (MOD) 91764 (typically 30 minutes face-to-face)  [] EVAL (HIGH) 44449 (typically 45 minutes face-to-face)  [] RE-EVAL     [] LN(05823) x     [] Dry needle 1 or 2 Muscles (36108)  [] NMR (80171) x     [] Dry needle 3+ Muscles (10692)  [] Manual (70228) x     [] Ultrasound (92100) x  [] TA (70848) x     [] Mech Traction (45672)  [] ES(attended) (86173)     [] ES (un) (72133):   [] Vasopump (10086) [] Ionto (85481)   [x] Aquatic Ind (47504) x 2    [x] Aquatic Group (38753) x   [] Other:        Treatment/Activity Tolerance:  [] Patient tolerated treatment well [] Patient limited by fatigue  [x] Patient limited by pain  [] Patient limited by other medical complications  [x] Other:  Increased awareness of posture/ relaxation. At end of seated ex pt c/o of feeling hot and not feeling well. Mask taken off and got out of water and improved. Prognosis: [x] Good [] Fair  [] Poor       GOALS:  Patient stated goal: \"to be in less pain\"  [x]? Progressing: []? Met: []? Not Met: []? Adjusted     Therapist goals for Patient:   Short Term Goals: To be achieved in: 2 weeks  1. Independent in HEP and progression per patient tolerance, in order to prevent re-injury. [x]? Progressing: []? Met: []?  Not Met: []? Adjusted  2. Patient will have a decrease in pain by 20-30% to facilitate improvement in movement, function, and ADLs as indicated by Functional Deficits. [x]? Progressing: []? Met: []? Not Met: []? Adjusted   3. Patient will tolerate 40 min pool exercise  []? Progressing: []? Met: []? Not Met: []? Adjusted     Long Term Goals: To be achieved in: at discharge   1. Disability index score of 30 or less on the [place] to assist with reaching prior level of function. [x]? Progressing: []? Met: []? Not Met: []? Adjusted   2. Patient will have a decrease in pain by 20-30% to facilitate improvement in movement, function, and ADLs as indicated by Functional Deficits. [x]? Progressing: []? Met: []? Not Met: []? Adjusted   3. Patient will demonstrate increased AROM to [facility] with min-no pain to allow for proper joint functioning as indicated by patients Functional Deficits. [x]? Progressing: []? Met: []? Not Met: []? Adjusted  4. Patient will demonstrate an increase in strength to 5/5 B UE/LE to allow for proper functional mobility as indicated by patients Functional Deficits. [x]? Progressing: []? Met: []? Not Met: []? Adjusted  5. Patient will return to light household duties without increased symptoms or restriction. [x]? Progressing: []? Met: []? Not Met: []? Adjusted  6. \"sleep soundly\"  [x]? Progressing: []? Met: []? Not Met: []? Adjusted            Patient Requires Follow-up: [x] Yes  [] No    Plan:   [x] Continue per plan of care [] Alter current plan (see comments)  [] Plan of care initiated [] Hold pending MD visit [] Discharge    Plan for Next Session:  Gradually progress aquatic exercise as tolerated to increase   ROM, strength, and endurance to improve ADL's and decrease pain. ADD: increase gt, seated ex, NBOS balance,  as tolerated.       Electronically signed by:  Gianna Zeng,     Note: If patient does not return for scheduled/recommended follow up visits, this note will serve as a discharge from care along with the most recent update on progress.

## 2021-06-03 ENCOUNTER — HOSPITAL ENCOUNTER (OUTPATIENT)
Dept: PHYSICAL THERAPY | Age: 41
Setting detail: THERAPIES SERIES
Discharge: HOME | End: 2021-06-03
Payer: COMMERCIAL

## 2021-06-03 NOTE — FLOWSHEET NOTE
[facility], [facility]  [address] [place], [facility]  Phone: [phone]   Fax:     [phone]    Physical Therapy  Cancellation/No-show Note  Patient Name:  Harlan Rocha  :  1980   Date:  6/3/2021  Cancelled visits to date: 1  No-shows to date: 0    Patient status for today's appointment patient:  [x]  Cancelled  []  Rescheduled appointment  []  No-show     Reason given by patient:  [x]  Patient ill  []  Conflicting appointment  []  No transportation    []  Conflict with work  []  No reason given  [x]  Other:     Comments:  Not feeling well  Phone call information:   []  Phone call made today to patient at _ time at number provided:      []  Patient answered, conversation as follows:    []  Patient did not answer, message left as follows:  [x]  Phone call not made today    Electronically signed by:  Coby Kirby, PTA  [id number]

## 2021-06-08 ENCOUNTER — HOSPITAL ENCOUNTER (OUTPATIENT)
Dept: PHYSICAL THERAPY | Age: 41
Setting detail: THERAPIES SERIES
Discharge: HOME | End: 2021-06-08
Payer: COMMERCIAL

## 2021-06-08 PROCEDURE — 97113 AQUATIC THERAPY/EXERCISES: CPT

## 2021-06-08 PROCEDURE — 97150 GROUP THERAPEUTIC PROCEDURES: CPT

## 2021-06-08 NOTE — FLOWSHEET NOTE
worsened over the past few months with pain worse in back, neck, upper shoulders and thighs. Pain up to 8/10 on a typical day and gets N/T with activity. Pt has TENS that she uses when needed.      Subjective:    21  Chief c/o L ankle, Neck/ LBP. To pool with boot L foot. Increased neck pain after sneeze yesterday where she felt a pop. Doing L ankle theraband ex at home per MD.  21  Increased pain after first aquatics. Walking from bed to bathroom at home without boot. 21 sore after aquatic therapy. Walking in house w/o boot. Not doing much activity though. 10 min late   sat on floor with legs straight and aggravated LB. increase ache x 1 day after pool,  Overall feels pool helpful.   15 min late          Objective:    Observation:    Test measurements:    Land-based Visits Exercises/Activities: No Land Rx today  Therapeutic Ex (51073)  Min: Resistance/Repetitions Notes                  Therapeutic Activity (83070) Min:10 See below                   NMR re-education (76991) Min:                          Manual Intervention (.[id number])  Min:                    Modalities  Min:               Aquatic Visits Exercises/Activities:  TO and FROM POOL WITH BOOT L FOOT  Aquatic Abbreviation Key  B= Belt DB= Dumbells T= Theratube   G=Gloves N= Noodles W= Weights   P= Paddles WW=Water Walker K= Kickboard        Transfers:   LIFT due to L ankle      % Immersion: 75%-90%            Ambulation:  laps   25% WB Exercises UE:  B   25% WB    Forwards 2  Shoulder Shrugs 5    Lateral 1 Shoulder circles 5    Marching    Scapular Retraction 5    Retro   Rolling 5    Cariocas  Push Downs 5   Multifidus walkouts w/paddle  IR/ER 5     Punching    Stretchin sec  B Rowing 5   Gastroc/Soleus   Elbow Flex/Ext 5   Hamstring  1 Shldr Flex/Ext 5   Knee flex stretch   Shldr Abd/Add 5   Piriformis   Horiz Abd/Add 5   Hip flexor    Arm Circles 5   SKTC   PNF Diagonals 5     B UE pull down to hips Deferred  aggravated was instructed in the following for HEP:     . Patient verbalized/demonstrated understanding and was issued written handout. Charges: Therapeutic Exercise and NMR EXR  [] (36972) Provided verbal/tactile cueing for activities related to strengthening, flexibility, endurance, ROM for improvements in LE, proximal hip, and core control with self care, mobility, lifting, ambulation.  [] (60154) Provided verbal/tactile cueing for activities related to improving balance, coordination, kinesthetic sense, posture, motor skill, proprioception  to assist with LE, proximal hip, and core control in self care, mobility, lifting, ambulation and eccentric single leg control.      NMR and Therapeutic Activities:    [] (55525 or 56865) Provided verbal/tactile cueing for activities related to improving balance, coordination, kinesthetic sense, posture, motor skill, proprioception and motor activation to allow for proper function of core, proximal hip and LE with self care and ADLs and functional mobility.   [] (71213) Gait Re-education- Provided training and instruction to the patient for proper LE, core and proximal hip recruitment and positioning and eccentric body weight control with ambulation re-education including up and down stairs     Home Exercise Program:    [x] (16024) Reviewed/Progressed HEP activities related to strengthening, flexibility, endurance, ROM of core, proximal hip and LE for functional self-care, mobility, lifting and ambulation/stair navigation   [] (62566)Reviewed/Progressed HEP activities related to improving balance, coordination, kinesthetic sense, posture, motor skill, proprioception of core, proximal hip and LE for self care, mobility, lifting, and ambulation/stair navigation      Manual Treatments:  PROM / STM / Oscillations-Mobs:  G-I, II, III, IV (PA's, Inf., Post.)  [] (71041) Provided manual therapy to mobilize LE, proximal hip and/or LS spine soft tissue/joints for the purpose of modulating pain, promoting relaxation,  increasing ROM, reducing/eliminating soft tissue swelling/inflammation/restriction, improving soft tissue extensibility and allowing for proper ROM for normal function with self care, mobility, lifting and ambulation. Individual Aquatic Therapy:  [x] (51729) Provided verbal and tactile cueing for strengthening, flexibility, ROM using the therapeutic properties of water (buoyancy, resistance) for improvements in core control, mobility and ambulation     Aquatic Group:  [x] (63819) Provided intermittent verbal and tactile cueing for strengthening, endurance, flexibility and ROM for 2-4 individuals that do not require one-on one patient contact by the therapist       Land Timed Code Treatment Minutes:    Land Total Treatment Minutes: Individual Aquatic Minutes: 30   Aquatic Group Minutes: 10     [] EVAL (LOW) 54281 (typically 20 minutes face-to-face)  [] EVAL (MOD) 78523 (typically 30 minutes face-to-face)  [] EVAL (HIGH) 58404 (typically 45 minutes face-to-face)  [] RE-EVAL     [] RG(62410) x     [] Dry needle 1 or 2 Muscles (72503)  [] NMR (89038) x     [] Dry needle 3+ Muscles (30990)  [] Manual (55685) x     [] Ultrasound (68404) x  [] TA (57537) x     [] Mech Traction (88986)  [] ES(attended) (79282)     [] ES (un) (89935):   [] Vasopump (68228) [] Ionto (06714)   [x] Aquatic Ind (16488) x 2    [x] Aquatic Group (33450) x   [] Other:        Treatment/Activity Tolerance:  [x] Patient tolerated treatment well [] Patient limited by fatigue  [] Patient limited by pain  [] Patient limited by other medical complications  [x] Other: Tolerated aquatic ex much better today w/o increased c/o. Prognosis: [x] Good [] Fair  [] Poor       GOALS:  Patient stated goal: \"to be in less pain\"  [x]? Progressing: []? Met: []? Not Met: []? Adjusted     Therapist goals for Patient:   Short Term Goals: To be achieved in: 2 weeks  1.  Independent in HEP and progression per patient tolerance, in return for scheduled/recommended follow up visits, this note will serve as a discharge from care along with the most recent update on progress.

## 2021-06-10 ENCOUNTER — HOSPITAL ENCOUNTER (OUTPATIENT)
Dept: PHYSICAL THERAPY | Age: 41
Setting detail: THERAPIES SERIES
Discharge: HOME | End: 2021-06-10
Payer: COMMERCIAL

## 2021-06-10 PROCEDURE — 97150 GROUP THERAPEUTIC PROCEDURES: CPT

## 2021-06-10 PROCEDURE — 97113 AQUATIC THERAPY/EXERCISES: CPT

## 2021-06-10 NOTE — FLOWSHEET NOTE
[address] [facility],[address], [facility]  [address] [facility], [facility]  Phone: [phone]   Fax:     [phone]      Physical Therapy Daily/Aquatic Flow Sheet   Date:  6/10/2021    Patient Name:  Jeromy Villanueva    :  1980  MRN: [id number]    Restrictions/Precautions:    Pertinent Medical History:  Relevant Medical History: boot on left ankle from trip down the steps 2 wks ago ( to f/u with MD in 3 wks - but MD is aware that she will be getting into the pool for fibromyalgia and is ok with this and ok to wean out of boot when ready ); cervical fusion ; Crohns disease; HNP lumbar spine; anxiety; depression; migraines    Medical/Treatment Diagnosis Information:   · Diagnosis: Fibromyalgia, cervical disc disorder  · Treatment Diagnosis: Decreased functional mobility 2/2 cervical pain/fibromyalgia    Insurance/Certification information:  PT Insurance Information: [address]  Physician Information:  Referring Practitioner: Anil Mooney MD  Plan of care signed (Y/N):     Date of Patient follow up with Physician:      Progress Report: []  Yes  [x]  No     Date Range for reporting period:  Beginnin2021  Ending:      Progress report due (10 Rx/or 30 days whichever is less): 3/01/54    Recertification due (POC duration/ or 90 days whichever is less):      Visit # POC/Insurance Allowable Auth Needed     []Yes   [x]No     Latex Allergy:  [x]NO      []YES  Preferred Language for Healthcare:   [x]English       []Other:    Functional Scale:     Date assessed: at eval  Test: Julio Cesar   Score:49    Pain level:    LBP   9/10                          After Rx  9/10                  Neck  7/10                                         5/10                L ankle 6/10                                        6/10    History of Injury:SUBJECTIVE: Patient stated complaint: Pt notes new dx of fibromyalgia but has been dealing with pain since .   Pain has worsened over the past few months with pain worse in back, neck, upper shoulders and thighs. Pain up to 8/10 on a typical day and gets N/T with activity. Pt has TENS that she uses when needed.      Subjective:    21  Chief c/o L ankle, Neck/ LBP. To pool with boot L foot. Increased neck pain after sneeze yesterday where she felt a pop. Doing L ankle theraband ex at home per MD.  21  Increased pain after first aquatics. Walking from bed to bathroom at home without boot. 21 sore after aquatic therapy. Walking in house w/o boot. Not doing much activity though. 10 min late   sat on floor with legs straight and aggravated LB. increase ache x 1 day after pool,  Overall feels pool helpful. 15 min late  6-10-21 reports sleeping/ walking a little better. Taking less pain meds on non-pool days. 10 min late. To see ankle MD 10-14-21. Walking to/ from pool with boot L foot. Objective:    Observation:    Test measurements:    Land-based Visits Exercises/Activities: No Land Rx today  Therapeutic Ex (16886)  Min: Resistance/Repetitions Notes                  Therapeutic Activity (73776) Min:10 See below                   NMR re-education (35260) Min:                          Manual Intervention (.97.60)  Min:                    Modalities  Min:               Aquatic Visits Exercises/Activities:  TO and FROM POOL WITH BOOT L FOOT  Aquatic Abbreviation Key  B= Belt DB= Dumbells T= Theratube   G=Gloves N= Noodles W= Weights   P= Paddles WW=Water Walker K= Kickboard        Transfers:   LIFT due to L ankle      % Immersion: 75%-90%            Ambulation:  laps   25% WB Exercises UE:  B   25% WB    Forwards 3 Shoulder Shrugs 7    Lateral 1 Shoulder circles 7    Marching   1 @ 50 % WB due to decreased balance.   Scapular Retraction 7    Retro   Rolling 7    Cari[place]as  Push Downs 7   Multifidus walkouts w/paddle  IR/ER 7     Punching    Stretchin sec  B Rowing 7   Gastroc/Soleus   Elbow Flex/Ext 7 Hamstring  2 Shldr Flex/Ext 7   Knee flex stretch   Shldr Abd/Add 7   Piriformis   Horiz Abd/Add 7   Hip flexor    Arm Circles 7   SKTC   PNF Diagonals 7     B UE pull down to hips  7 no c/o   DKTC  UE oscillations f/b, s/s    ITB   Wall Push-ups    Quad  Figure 8's    Mid back   Buoy pull/push downs    UT  Tband rows    Rhom  Tband lats    Post Shoulder  Lumbar Rot w/ paddles    Pec   Small ball pull downs                   diagonals             Cervical:       AROM Flex       AROM Extension     LEs exercises:  25%  WB  AROM Side Bending    Marching  10 AROM Rotation  gentle R/L x 10  @ 95% immersion   Heel Raises X Chin tucks    Toe Raises X Chin nods     Squats 8      Hamstring Curls 8      Hip Flexion 8 Balance: Hip Abduction 8 SLS    Hip Circles 8 Tandem stance 15 R/L   TA set 8 NBOS eyes open 30 sec    Glut Set 8 NBOS eyes closed 30 sec    Hip Extension  Hand to Opposite Knee    Hip Adduction    Box Step     Hip IR   Noodle Stance     Hip ER  Stop/Go Gait    Fig 8's  Switch Gait                Seated: B Functional:    Ankle Pumps 12 Step up forward    Ankle circles  Step up lateral    Knee flex & ext 12 Step down    Hip Abd & Adduction 12 [place] squats    Bicycle  12 Crate Lifts    Add Set with ball 12 Lunges forward    LX stab with med ball throws  Lunges lateral    Ankle INV  Lunges retro    Ankle EV  Lower ab curl with noodle    Toe wiggles 12 Upper ab curl with ball      Med ball straight lifts      Med ball diagonal lifts      Hydrorider          Noodle:      Leg Press  Deep Water:    Noodle hang at wall  Jog    Noodle hang deep water  Jumping Jacks    Noodle Bicycle  Heel to toe      Hand to opposite knee    Jet massage   LB  Cross country skier      Rocking Horse      Other Therapeutic Activities:  Pt was educated on PT POC, Diagnosis, Prognosis, pathomechanics as well as frequency and duration of scheduling future physical therapy appointments.  Time was also taken on this day to answer all PROM / STM / Oscillations-Mobs:  G-I, II, III, IV (PA's, Inf., Post.)  [] (19715) Provided manual therapy to mobilize LE, proximal hip and/or LS spine soft tissue/joints for the purpose of modulating pain, promoting relaxation,  increasing ROM, reducing/eliminating soft tissue swelling/inflammation/restriction, improving soft tissue extensibility and allowing for proper ROM for normal function with self care, mobility, lifting and ambulation. Individual Aquatic Therapy:  [x] (77436) Provided verbal and tactile cueing for strengthening, flexibility, ROM using the therapeutic properties of water (buoyancy, resistance) for improvements in core control, mobility and ambulation     Aquatic Group:  [x] (61163) Provided intermittent verbal and tactile cueing for strengthening, endurance, flexibility and ROM for 2-4 individuals that do not require one-on one patient contact by the therapist       Land Timed Code Treatment Minutes:    Land Total Treatment Minutes: Individual Aquatic Minutes: 15   Aquatic Group Minutes: 25     [] EVAL (LOW) 21149 (typically 20 minutes face-to-face)  [] EVAL (MOD) 28211 (typically 30 minutes face-to-face)  [] EVAL (HIGH) 57489 (typically 45 minutes face-to-face)  [] RE-EVAL     [] VN(95502) x     [] Dry needle 1 or 2 Muscles (74066)  [] NMR (90043) x     [] Dry needle 3+ Muscles (19388)  [] Manual (40970) x     [] Ultrasound (13779) x  [] TA (26448) x     [] Mech Traction (80320)  [] ES(attended) (26965)     [] ES (un) (00329):   [] Vasopump (71360) [] Ionto (20772)   [x] Aquatic Ind (85143) x     [x] Aquatic Group (71615) x   [] Other:        Treatment/Activity Tolerance:  [x] Patient tolerated treatment well [] Patient limited by fatigue  [] Patient limited by pain  [] Patient limited by other medical complications  [x] Other: Tolerated aquatic ex much better today w/o increased c/o. Decreased balance with marching gait.     Prognosis: [x] Good [] Fair  [] Poor       GOALS:  Patient increase   ROM, strength, and endurance to improve ADL's and decrease pain. ADD: increase gt, seated ex, LE ex reps/ UE reps box step, squats as tolerated. Electronically signed by:  Lisa Alvarado,     Note: If patient does not return for scheduled/recommended follow up visits, this note will serve as a discharge from care along with the most recent update on progress.

## 2021-06-15 ENCOUNTER — HOSPITAL ENCOUNTER (OUTPATIENT)
Dept: PHYSICAL THERAPY | Age: 41
Setting detail: THERAPIES SERIES
Discharge: HOME | End: 2021-06-15
Payer: COMMERCIAL

## 2021-06-15 PROCEDURE — 97150 GROUP THERAPEUTIC PROCEDURES: CPT

## 2021-06-15 PROCEDURE — 97112 NEUROMUSCULAR REEDUCATION: CPT

## 2021-06-15 PROCEDURE — 97530 THERAPEUTIC ACTIVITIES: CPT

## 2021-06-15 PROCEDURE — 97113 AQUATIC THERAPY/EXERCISES: CPT

## 2021-06-15 ASSESSMENT — PAIN SCALES - QUEBEC BACK PAIN DISABILITY SCALE
QUEBEC DISABILITY INDEX: 40-59%
RUN ONE BLOCK OR 100M: 5
QUEBEC CMS MODIFIER: CK
GET OUT OF BED: 0
TURN OVER IN BED: 0
RIDE IN A CAR: 4
MOVE A CHAIR: 0
LIFT AND CARRY A HEAVY SUITCASE: 5
BEND OVER TO CLEAN THE BATHTUB: 3
SIT IN A CHAIR FOR SEVERAL HOURS: 5
STAND UP FOR 20 TO 30 MINUTES: 4
THROW A BALL: 0
MAKE YOUR BED: 0
SLEEP THROUGH THE NIGHT: 2
PUT ON SOCKS OR PANYHOSE: 2
TAKE FOOD OUT OF THE REFRIGERATOR: 0
REACH UP TO HIGH SHELVES: 3
WALK A FEW BLOCKS OR 300 TO 400M: 3
CLIMB ONE FLIGHT OF STAIRS: 2
PULL OR PUSH HEAVY DOORS: 1
TOTAL SCORE: 48
CARRY TWO BAGS OF GROCERIES: 4
WALK SEVERAL KILOMETERS  OR MILES: 5

## 2021-06-15 NOTE — FLOWSHEET NOTE
[address], [facility]  [address] [facility], [facility]  Phone: [phone]   Fax:     [phone]      Physical Therapy Re-Certification Plan of Care/MD UPDATE      Dear  Dr. Ignacio Villela,    We had the pleasure of treating the following patient for physical therapy services at [address]. A summary of our findings can be found in the updated assessment below. This includes our plan of care. If you have any questions or concerns regarding these findings, please do not hesitate to contact me at the office phone number checked above. Thank you for the referral.     Physician Signature:________________________________Date:__________________  By signing above (or electronic signature), therapists plan is approved by physician    Date Range Of Visits: 21-6/15/21  Total Visits to Date: 7  Overall Response to Treatment:   [x]Patient is responding well to treatment and improvement is noted with regards  to goals - see goal reassessment below   []Patient should continue to improve in reasonable time if they continue HEP   []Patient has plateaued and is no longer responding to skilled PT intervention    []Patient is getting worse and would benefit from return to referring MD   []Patient unable to adhere to initial POC   [x]Other: Pt is noting 50% improvement with PT so far. Although she is still getting high levels of pain up to 9-10/10 with ADLs, she notes that this is not happening as often as it used to and she is having more \"good\" days when she can tolerate more activities with less pain. Recommendation:    [x]Continue Aquatic PT 2x / wk for 4 weeks.     []Hold PT, pending MD visit        Physical Therapy Daily/Aquatic Flow Sheet   Date:  6/15/2021    Patient Name:  Isabel Shay    :  1980  MRN: [id number]    Restrictions/Precautions:    Pertinent Medical History:  Relevant Medical History: boot on left ankle from trip down the steps 2 wks ago ( to f/u with MD in 3 wks - but MD is aware that she will be getting into the pool for fibromyalgia and is ok with this and ok to wean out of boot when ready ); cervical fusion ; Crohns disease; HNP lumbar spine; anxiety; depression; migraines    Medical/Treatment Diagnosis Information:   · Diagnosis: Fibromyalgia, cervical disc disorder  · Treatment Diagnosis: Decreased functional mobility 2/2 cervical pain/fibromyalgia    Insurance/Certification information:  PT Insurance Information: [address]  Physician Information:  Referring Practitioner: Dannie Kessler MD  Plan of care signed (Y/N):     Date of Patient follow up with Physician:      Progress Report: [x]  Yes 6/15/21  []  No     Date Range for reporting period:  Beginnin2021  Ending:      Progress report due (10 Rx/or 30 days whichever is less): 9/57/10    Recertification due (POC duration/ or 90 days whichever is less):      Visit # POC/Insurance Allowable Auth Needed   +8  []Yes   [x]No     Latex Allergy:  [x]NO      []YES  Preferred Language for Healthcare:   [x]English       []Other:    Functional Scale:     Date assessed: 6-15-21   7th visit  Test: Julio Cesar   Score:48    Pain level:    LBP   9/10                          After Rx  10                  Neck  6/10                                         6/10                L ankle /10                                        6/10    History of Injury:SUBJECTIVE: Patient stated complaint: Pt notes new dx of fibromyalgia but has been dealing with pain since . Pain has worsened over the past few months with pain worse in back, neck, upper shoulders and thighs. Pain up to 8/10 on a typical day and gets N/T with activity. Pt has TENS that she uses when needed.      Subjective:    21  Chief c/o L ankle, Neck/ LBP. To pool with boot L foot. Increased neck pain after sneeze yesterday where she felt a pop.  Doing L ankle theraband ex at home per MD.  21  Increased pain after first aquatics. Walking from bed to bathroom at home without boot. 21 sore after aquatic therapy. Walking in house w/o boot. Not doing much activity though. 10 min late   sat on floor with legs straight and aggravated LB. increase ache x 1 day after pool,  Overall feels pool helpful. 15 min late  6-10-21 reports sleeping/ walking a little better. Taking less pain meds on non-pool days. 10 min late. To see ankle MD 10-14-21. Walking to/ from pool with boot L foot. 6/15/21: see goal reassessment below . Reports saw ankle MD, to wear lace up ankle brace in house and boot on stairs and outdoors.             Objective:    Observation:    Test measurements:    Land-based Visits Exercises/Activities: Re-eval per PT  Therapeutic Ex (59138)  Min: Resistance/Repetitions Notes                  Therapeutic Activity (70427) Min:30 See below                   NMR re-education (18112) Min: 15     6/15 - Pt presents  concerns with urinary incontinence lately; she is to call MD today with new concerns For hep issued:  kegels 10 x 5 sec hold  TA set 10 x w/ deep breath  kegel and TA together x 10   Add set 10 x 5 sec   GS 10 x 5 sec   HL TB hip abd x 10 lime grn                   Manual Intervention ([phone])  Min:                    Modalities  Min:               Aquatic Visits Exercises/Activities:  TO and FROM POOL WITH BOOT L FOOT  Aquatic Abbreviation Key  B= Belt DB= Dumbells T= Theratube   G=Gloves N= Noodles W= Weights   P= Paddles WW=Water Walker K= Kickboard        Transfers:   LIFT due to L ankle      % Immersion: 75%-90%            Ambulation:  laps   25% WB Exercises UE:  B   25% WB    Forwards 4 Shoulder Shrugs 7    Lateral 1 Shoulder circles 7    Marching 1 improved balance today  Scapular Retraction 7    Retro   Rolling 7    [place]  Push Downs 7   Multifidus walkouts w/paddle  IR/ER 7     Punching    Stretchin sec  B Rowing 7 Gastroc/Soleus   Elbow Flex/Ext 7   Hamstring  2 Shldr Flex/Ext 7   Knee flex stretch   Shldr Abd/Add 7   Piriformis   Horiz Abd/Add 7   Hip flexor    Arm Circles 7   SKTC   PNF Diagonals 7     B UE pull down to hips  7 no c/o   DKTC  UE oscillations f/b, s/s    ITB   Wall Push-ups    Quad  Figure 8's    Mid back   Buoy pull/push downs    UT  Tband rows    Rhom  Tband lats    Post Shoulder  Lumbar Rot w/ paddles    Pec   Small ball pull downs                   diagonals             Cervical:       AROM Flex       AROM Extension     LEs exercises:  25%  WB  AROM Side Bending    Marching  10 AROM Rotation     Heel Raises X Chin tucks    Toe Raises X Chin nods     Squats 10      Hamstring Curls 10      Hip Flexion 10 Balance: Hip Abduction 10 SLS    Hip Circles 10 Tandem stance 30 R/L   TA set 10 NBOS eyes open 30 sec    Glut Set 10 NBOS eyes closed 30 sec    Hip Extension  Hand to Opposite Knee 5   Hip Adduction    Box Step     Hip IR   Noodle Stance     Hip ER  Stop/Go Gait    Fig 8's  Switch Gait                Seated: B Functional:    Ankle Pumps 12 Step up forward    Ankle circles  Step up lateral    Knee flex & ext 12 Step down    Hip Abd & Adduction 12 Blandon squats    Bicycle  12 Crate Lifts    Add Set with ball 12 Lunges forward    LX stab with med ball throws  Lunges lateral    Ankle INV  Lunges retro    Ankle EV  Lower ab curl with noodle    Toe wiggles 12 Upper ab curl with ball      Med ball straight lifts      Med ball diagonal lifts      Hydrorider          Noodle:      Leg Press  Deep Water:    Noodle hang at wall  Jog    Noodle hang deep water  Jumping Jacks    Noodle Bicycle  Heel to toe      Hand to opposite knee    Jet massage   LB  Cross country skier      Rocking Horse      Other Therapeutic Activities:  Pt was educated on PT POC, Diagnosis, Prognosis, pathomechanics as well as frequency and duration of scheduling future physical therapy appointments.  Time was also taken on this day to answer all patient questions and participation in PT. Reviewed appointment policy in detail with patient and patient verbalized understanding. Pool tour and info issued x 10 min      Home Exercise Program:  Patient was instructed in the following for HEP:     . Patient verbalized/demonstrated understanding and was issued written handout. Charges: Therapeutic Exercise and NMR EXR  [] (40859) Provided verbal/tactile cueing for activities related to strengthening, flexibility, endurance, ROM for improvements in LE, proximal hip, and core control with self care, mobility, lifting, ambulation.  [] (75695) Provided verbal/tactile cueing for activities related to improving balance, coordination, kinesthetic sense, posture, motor skill, proprioception  to assist with LE, proximal hip, and core control in self care, mobility, lifting, ambulation and eccentric single leg control.      NMR and Therapeutic Activities:    [] (43732 or 63485) Provided verbal/tactile cueing for activities related to improving balance, coordination, kinesthetic sense, posture, motor skill, proprioception and motor activation to allow for proper function of core, proximal hip and LE with self care and ADLs and functional mobility.   [] (09157) Gait Re-education- Provided training and instruction to the patient for proper LE, core and proximal hip recruitment and positioning and eccentric body weight control with ambulation re-education including up and down stairs     Home Exercise Program:    [x] (41601) Reviewed/Progressed HEP activities related to strengthening, flexibility, endurance, ROM of core, proximal hip and LE for functional self-care, mobility, lifting and ambulation/stair navigation   [] (05182)Reviewed/Progressed HEP activities related to improving balance, coordination, kinesthetic sense, posture, motor skill, proprioception of core, proximal hip and LE for self care, mobility, lifting, and ambulation/stair navigation Manual Treatments:  PROM / STM / Oscillations-Mobs:  G-I, II, III, IV (PA's, Inf., Post.)  [] (94256) Provided manual therapy to mobilize LE, proximal hip and/or LS spine soft tissue/joints for the purpose of modulating pain, promoting relaxation,  increasing ROM, reducing/eliminating soft tissue swelling/inflammation/restriction, improving soft tissue extensibility and allowing for proper ROM for normal function with self care, mobility, lifting and ambulation.      Individual Aquatic Therapy:  [x] (40256) Provided verbal and tactile cueing for strengthening, flexibility, ROM using the therapeutic properties of water (buoyancy, resistance) for improvements in core control, mobility and ambulation     Aquatic Group:  [x] (45297) Provided intermittent verbal and tactile cueing for strengthening, endurance, flexibility and ROM for 2-4 individuals that do not require one-on one patient contact by the therapist       Land Timed Code Treatment Minutes: 45   Land Total Treatment Minutes: [place] [address] Minutes: 15   Aquatic Group Minutes: 30     [] EVAL (LOW) 73579 (typically 20 minutes face-to-face)  [] EVAL (MOD) 20066 (typically 30 minutes face-to-face)  [] EVAL (HIGH) 58463 (typically 45 minutes face-to-face)  [] RE-EVAL     [] ENGL[place](06487) x     [] Dry needle 1 or 2 Muscles (79933)  [x] NMR (59600) x     [] Dry needle 3+ Muscles (71686)  [] Manual (39623) x     [] Ultrasound (12075) x  [x] TA (03609) x   2  [] Mech Traction (13983)  [] ES(attended) (04597)     [] ES (un) (91175):   [] Vasopump (11793) [] Ionto (11293)   [x] Aquatic Ind (51678) x     [x] Aquatic Group (79332) x   [] Other:        Treatment/Activity Tolerance:  [x] Patient tolerated treatment well [] Patient limited by fatigue  [] Patient limited by pain  [] Patient limited by other medical complications  [] Other:      Prognosis: [x] Good [] Fair  [] Poor       GOALS: updated 6/15/21    * saw ankle MD and she is to be in the boot for about another month when out in community and steps, but ok to start weaning out of it and lace up brace when just around the house - if not better in 1 month may do MRI     Patient stated goal: \"to be in less pain\"- tolerating more activities with less pain; intensity can still reach 9-10/10 but those bad days are not as often and she is having more good days  []? Progressing: [x]? Met: []? Not Met: []? Adjusted     Therapist goals for Patient:   Short Term Goals: To be achieved in: 2 weeks  1. Independent in HEP and progression per patient tolerance, in order to prevent re-injury. - progressing aquatic hep   [x]? Progressing: []? Met: []? Not Met: []? Adjusted  2. Patient will have a decrease in pain by 20-30% to facilitate improvement in movement, function, and ADLs as indicated by Functional Deficits. - rates overall 50% better   []? Progressing: [x]? Met: []? Not Met: []? Adjusted   3. Patient will tolerate 40 min pool exercise - tolerating 40 min in the pool; she does note fatigue, soreness and nausea immediately after sessions, but she will feel improvement for the next 2 days  []? Progressing: [x]? Met: []? Not Met: []? Adjusted     Long Term Goals: To be achieved in: at discharge   1. Disability index score of 30 or less on the [place] to assist with reaching prior level of function. - [place] 48   [x]? Progressing: []? Met: []? Not Met: []? Adjusted   2. Patient will have a decrease in pain by 60-70% to facilitate improvement in movement, function, and ADLs as indicated by Functional Deficits. - 50%  [x]? Progressing: []? Met: []? Not Met: []? Adjusted   3. Patient will demonstrate increased AROM to [facility] with min-no pain to allow for proper joint functioning as indicated by patients Functional Deficits. - L SKC, pirif and HS all min limitations with pain; R LE WFL   [x]? Progressing: []? Met: []? Not Met: []? Adjusted  4.  Patient will demonstrate an increase in strength to 5/5 B UE/LE to allow for proper functional mobility as indicated by patients Functional Deficits. - B UE strength - R UE flex, abd, IR/ER, bicep, tricep 5/5; L flex 4+/5, abd 4+/5, IR/ER, bicep,tricep 5/5; R hip flex, QD, HS 5/5, hip abd and ext 5/5; L hip flex 4+/5, hip abd and ext 4+/5, QD 5/5, HS 4+/5   [x]? Progressing: []? Met: []? Not Met: []? Adjusted  5. Patient will return to light household duties without increased symptoms or restriction. -light household duties - she is doing them but it is \"extremely painful still\"  []? Progressing: []? Met: [x]? Not Met: []? Adjusted  6. \"sleep soundly\"  - sleeping is improved on Tuesday and Thursdays after pool therapy, but still bad on the other nights  [x]? Progressing: []? Met: []? Not Met: []? Adjusted            Patient Requires Follow-up: [x] Yes  [] No    Plan: Cont pool 2 x wk x 4 more wks  [x] Continue per plan of care [] Alter current plan (see comments)  [] Plan of care initiated [] Hold pending MD visit [] Discharge    Plan for Next Session:  Gradually progress aquatic exercise as tolerated to increase   ROM, strength, and endurance to improve ADL's and decrease pain. ADD: increase gt, seated ex, UE reps box step, squats as tolerated. Electronically signed by:  Hal Crawley PT Land Therapist [id number]                                              Dylan Washburn PTA 0   aquatics    Note: If patient does not return for scheduled/recommended follow up visits, this note will serve as a discharge from care along with the most recent update on progress.

## 2021-06-17 ENCOUNTER — HOSPITAL ENCOUNTER (OUTPATIENT)
Dept: PHYSICAL THERAPY | Age: 41
Setting detail: THERAPIES SERIES
Discharge: HOME | End: 2021-06-17
Payer: COMMERCIAL

## 2021-06-22 ENCOUNTER — HOSPITAL ENCOUNTER (OUTPATIENT)
Dept: PHYSICAL THERAPY | Age: 41
Setting detail: THERAPIES SERIES
Discharge: HOME | End: 2021-06-22
Payer: COMMERCIAL

## 2021-06-22 NOTE — FLOWSHEET NOTE
[place] [facility], [facility]  [address] [facility] [place], [facility]  Phone: [phone]   Fax:     [phone]    Physical Therapy  Cancellation/No-show Note  Patient Name:  Pascual Osgood  :  1980   Date:  2021  Cancelled visits to date: 2  No-shows to date: 1    Patient status for today's appointment patient:  [x]  Cancelled  []  Rescheduled appointment  []  No-show     Reason given by patient:  []  Patient ill  []  Conflicting appointment  []  No transportation    []  Conflict with work  [x]  No reason given  []  Other:     Comments:       Phone call information:   []  Phone call made today to patient at  _ time at number provided:      []  Patient answered, conversation as follows:      []  Patient did not answer    [x]  Phone call not made today    Electronically signed by:  Yanelis Bloom, PTA  [id number]

## 2021-06-24 ENCOUNTER — HOSPITAL ENCOUNTER (OUTPATIENT)
Dept: PHYSICAL THERAPY | Age: 41
Setting detail: THERAPIES SERIES
Discharge: HOME | End: 2021-06-24
Payer: COMMERCIAL

## 2021-06-24 NOTE — FLOWSHEET NOTE
[place] [facility], [facility]  [address] [facility] [place], [facility]  Phone: [phone]   Fax:     [phone]    Physical Therapy  Cancellation/No-show Note  Patient Name:  Ulices Abraham  :  1980   Date:  2021  Cancelled visits to date: 3  No-shows to date: 1    Patient status for today's appointment patient:  [x]  Cancelled  []  Rescheduled appointment  []  No-show     Reason given by patient:  [x]  Patient ill  []  Conflicting appointment  []  No transportation    []  Conflict with work  []  No reason given  []  Other:     Comments:       Phone call information:   []  Phone call made today to patient at  _ time at number provided:      []  Patient answered, conversation as follows:      []  Patient did not answer    [x]  Phone call not made today    Electronically signed by:  Bernardo Marks, PTA  [id number]

## 2021-06-29 ENCOUNTER — HOSPITAL ENCOUNTER (OUTPATIENT)
Dept: PHYSICAL THERAPY | Age: 41
Setting detail: THERAPIES SERIES
Discharge: HOME | End: 2021-06-29
Payer: COMMERCIAL

## 2021-07-01 NOTE — PLAN OF CARE
[place] [facility], [facility]  [address] [facility]  Phone: [phone]   Fax:     [phone]      Physical Therapy Discharge Summary    Dear Dr. John Grubbs ,    We had the pleasure of treating the following patient for physical therapy services at [address]. A summary of our findings can be found in the discharge summary below. This includes our final assessment. If you have any questions or concerns regarding these findings, please do not hesitate to contact me at the office phone number checked above. Thank you for the referral.       Physician Signature:________________________________Date:__________________  By signing above, therapists plan is approved by physician          Patient: Everardo Hills   : 1980   MRN: [id number]  Referring Physician:        Evaluation Date: 2021           Medical/Treatment Diagnosis Information:   · Diagnosis: Fibromyalgia, cervical disc disorder  · Treatment Diagnosis: Decreased functional mobility 2/2 cervical pain/fibromyalgia     Insurance/Certification information:  Kieran Jones    Date Range of Treatment:  21-6/15/21  Total visits:7     Functional Outcomes Measure: at discharge  Test:[place]  Score:48    SUBJECTIVE:Was was showing improvement and continuation was recommended. However pt has cancelled or no show'd the past 4 visits and will be discharged per appt policy.         Pain Scale: 6-9/10    Type: [x]Constant   []Intermitment  []Radiating []Localized  []other:     Functional Limitations: all   []Sitting []Standing []Walking    []Squatting []Stairs            []ADL's  []Driving []Sports/Recreation  []Other:       Response to Treatment:   []Patient met all goals and has responded well to treatment and will continue HEP   []Patient should continue to improve in reasonable time if they continue

## 2022-08-19 ENCOUNTER — HOSPITAL ENCOUNTER (OUTPATIENT)
Dept: PHYSICAL THERAPY | Age: 42
Setting detail: THERAPIES SERIES
Discharge: HOME | End: 2022-08-19
Payer: COMMERCIAL

## 2022-08-19 PROCEDURE — 97750 PHYSICAL PERFORMANCE TEST: CPT

## 2022-08-19 NOTE — PLAN OF CARE
Outpatient Physical Therapy   Phone: [phone] Fax: [phone]    Functional Capacity Evaluation  Date: 2022        Patient Name:  Ashlee Curtis    :  1980  MRN: [id number]    Outcome Measures:    Optimal: 77; 63% dysfunction     Therapy Time   Individual Concurrent Group Co-treatment   Time In 0840         Time Out 1240         Minutes 240               Functional Capacity Evaluation completed this date. Results and full report will be available in [facility] under Media when complete.     Electronically signed by:  Jose Enrique Kuhn PT